# Patient Record
(demographics unavailable — no encounter records)

---

## 2025-02-05 NOTE — HISTORY OF PRESENT ILLNESS
[de-identified] : The patient presents with a history of recurrent ear infections. The child has had 5-6 ear infections in the past 6 months requiring antibiotics.  No parental concerns with hearing. There is no history of otorrhea.  History of discolored nasal discharge and uses nasal saline sprays   known Speech Delay but better than before sentences May have occasional snoring at night. Wakes during the night if he has nasal congestion with cold.  No problems with swallowing or with VPI/nasal regurgitation. History of adenoidectomy in [place] 2023 No throat/tonsil infections.   Passed NBHT AU.  Full term,  uncomplicated delivery with uncomplicated pregnancy.  No cyanosis, no ETT intubation, no home oxygen requirement, no NICU stay.

## 2025-02-05 NOTE — CONSULT LETTER
[Dear  ___] : Dear  [unfilled], [Consult Letter:] : I had the pleasure of evaluating your patient, [unfilled]. [Please see my note below.] : Please see my note below. [Consult Closing:] : Thank you very much for allowing me to participate in the care of this patient.  If you have any questions, please do not hesitate to contact me. [Sincerely,] : Sincerely, [FreeTextEntry2] : Dr. Christian Martínez [address], [address] , [place] [address] [FreeTextEntry3] : Allyson Diaz MD   Pediatric Otolaryngology/ Head & Neck Surgery [facility] , Otolaryngology; [facility]  [facility] [place] [address] Tel [phone] Fax [phone]

## 2025-02-05 NOTE — REASON FOR VISIT
[Other: ______] : provided by JEREMIAH [Initial Evaluation] : an initial evaluation for [Parents] : parents [Interpreters_IDNumber] : [id number] [Interpreters_FullName] : Nelson [FreeTextEntry3] : Terrance ID# [id number] [TWNoteComboBox1] : Georgian

## 2025-02-05 NOTE — ASSESSMENT
[FreeTextEntry1] : History of recurrent acute otitis media. I discussed the option of ear tube placement. I have explained the risks of myringotomy and tube including but not limited to general anesthesia, bleeding, infection, persistent symptoms, retained ear tube, otorrhea, tympanic membrane perforation, and possible need for further procedures.  The family would like to proceed with a BMT if fluid at fu. The patient will get floxin otic (5 drops bid for 3 days then as needed for otorrhea/infection).  Given repeat ear infections and congestion despite normal scope I do recommend a allergy team eval and nasal saline

## 2025-02-05 NOTE — DATA REVIEWED
[FreeTextEntry1] : An audiogram was performed today to evaluate eustachian tube status and hearing status and the results were reviewed and reveal: Tymps: AD type B tympanogram, AS type B tympanogram Soundfield/Thresholds: Mild HL

## 2025-02-05 NOTE — PHYSICAL EXAM
[Clear to Auscultation] : lungs were clear to auscultation bilaterally [Normal Gait and Station] : normal gait and station [Normal muscle strength, symmetry and tone of facial, head and neck musculature] : normal muscle strength, symmetry and tone of facial, head and neck musculature [Normal] : no cervical lymphadenopathy [Effusion] : effusion [Exposed Vessel] : left anterior vessel not exposed [Wheezing] : no wheezing [Increased Work of Breathing] : no increased work of breathing with use of accessory muscles and retractions

## 2025-02-05 NOTE — BIRTH HISTORY
[At Term] : at term [ Section] : by  section [None] : No maternal complications [Passed] : passed [de-identified] : low amniotic fluid, fetal distress

## 2025-02-21 NOTE — PHYSICAL EXAM
[Well Developed] : well developed [NAD] : in no acute distress [Thin] : thin [PERRL] : pupils were equal, round, reactive to light  [Moist & Pink Mucous Membranes] : moist and pink mucous membranes [CTAB] : lungs clear to auscultation bilaterally [Regular Rate and Rhythm] : regular rate and rhythm [Normal S1, S2] : normal S1 and S2 [Soft] : soft  [Normal Bowel Sounds] : normal bowel sounds [No HSM] : no hepatosplenomegaly appreciated [Normal rectal exam] : exam was normal [Normal Tone] : normal tone [Well-Perfused] : well-perfused [Interactive] : interactive [icteric] : anicteric [Respiratory Distress] : no respiratory distress  [Distended] : non distended [Tender] : non tender [Edema] : no edema [Cyanosis] : no cyanosis [Rash] : no rash [Jaundice] : no jaundice

## 2025-02-21 NOTE — CONSULT LETTER
[Consult Letter:] : I had the pleasure of evaluating your patient, [unfilled]. [Please see my note below.] : Please see my note below. [Consult Closing:] : Thank you very much for allowing me to participate in the care of this patient.  If you have any questions, please do not hesitate to contact me. [Sincerely,] : Sincerely, [Dear  ___] : Dear  [unfilled], [FreeTextEntry3] : Andriy Deluna MD Division of Pediatric Gastroenterology [facility]

## 2025-02-21 NOTE — REASON FOR VISIT
[Consultation] : a consultation visit [Mother] : mother [Father] : father [Medical Records] : medical records [Other: ______] : provided by JEREMIAH [Interpreters_IDNumber] : [id number] [Interpreters_FullName] : Delma [TWNoteComboBox1] : Czech

## 2025-02-21 NOTE — ASSESSMENT
[FreeTextEntry1] : 6 year old male with poor weight gain, poor appetite, low BMI with tendency toward constipation. Failure to thrive most likely secondary to inadequate caloric intake but would assess further for an inflammatory, systemic or infectious process  Plan: lab assessment stool for H pylori  inc caloric intake - resources provided nutritional supplements record dietary intake to assess caloric intake consider addition of appetite stimulant nutrition f/u

## 2025-02-21 NOTE — HISTORY OF PRESENT ILLNESS
[de-identified] : 6 year old male with poor weight gain and poor appetite. No c/o abd pain. No N/V. BMs 1-2x/d, [place] 1 or 3. Tendency toward constipation. History of adenoidectomy in [place] 2023 IUGR, Csxn repeat scheduled Lives with parents and older brother Diet Hx obtained,  Family Hx: non-contributory

## 2025-02-25 NOTE — REASON FOR VISIT
[Initial Evaluation] : an initial evaluation [Patient] : patient [Parents] : parents [Language Line ] : provided by Language Line   [Interpreters_IDNumber] : [id number] [Interpreters_FullName] : [employer] [TWNoteComboBox1] : Syriac

## 2025-02-25 NOTE — HISTORY OF PRESENT ILLNESS
[de-identified] : (School) Unsure PO intake [de-identified] : (School) Unsure PO intake [de-identified] : (2:30p; home): Offered Chicken w/ pasta OR Pizza OR Croissant. Pt consumes 3 d/wk. Otherwises refuses. [de-identified] : (7p) Offered Protein (Fish/chicken/meat) + Rice/pasta + cucumbers. Pt consumes "few bites" [de-identified] : (1-2 d/wk) 1 yogurt OR Piece of cake [FreeTextEntry1] : DANIEL GUEVARA is a 7 yo male w/ FTT, here for nutrition assessment for poor weight gain.  Overall, dietary intake is vague. Pt is offered breakfast and lunch at school; however unclear PO intake. Parents offer meals BID (2:30p and dinner) however w/ fluctuating intake. FOC endorses "few bites;" however unable to specify. Pt loves cucumbers, but declines cucumbers dipped in condiments/sauces or mixed w/ yogurt. Parents offered hummus, however pt decline to try. Pt dislikes avocado and will pick out from meals. Pt dislikes Nut butters. Pt tolerates small amounts of nuts and cheese in pasta. Pt decline to eat cheese alone. Parents denies any chewing or swallowing difficulties. Parents denies any food allergies or dietary restrictions.   Since last GI Visit (2/21), FOC initiated Pediasure Supplement. Pt likes Pediasure G&G (chocolate) and drinks 1 can/d (Provides 240 kcal/d). Pt has not trialed other flavors.  Since 2/5/25-2/24/25, pt gained 0.21 kg x 19d (11 g/d; excellent) Good UO Constipation + straining daily.  Receives Speech therapy 2x/wk.

## 2025-05-01 NOTE — PHYSICAL EXAM
[Alert] : alert [Well Nourished] : well nourished [Healthy Appearance] : healthy appearance [No Acute Distress] : no acute distress [Well Developed] : well developed [Normal Pupil & Iris Size/Symmetry] : normal pupil and iris size and symmetry [No Discharge] : no discharge [No Photophobia] : no photophobia [Sclera Not Icteric] : sclera not icteric [Normal TMs] : both tympanic membranes were normal [Normal Lips/Tongue] : the lips and tongue were normal [Normal Outer Ear/Nose] : the ears and nose were normal in appearance [No Thrush] : no thrush [Pale mucosa] : pale mucosa [Boggy Nasal Turbinates] : boggy and/or pale nasal turbinates [Pharyngeal erythema] : pharyngeal erythema [Posterior Pharyngeal Cobblestoning] : posterior pharyngeal cobblestoning [Clear Rhinorrhea] : clear rhinorrhea was seen [Supple] : the neck was supple [Normal Rate and Effort] : normal respiratory rhythm and effort [No Crackles] : no crackles [No Retractions] : no retractions [Bilateral Audible Breath Sounds] : bilateral audible breath sounds [Normal Rate] : heart rate was normal  [Regular Rhythm] : with a regular rhythm [Soft] : abdomen soft [Not Tender] : non-tender [Not Distended] : not distended [No HSM] : no hepato-splenomegaly [Normal Cervical Lymph Nodes] : cervical [No Rash] : no rash [No clubbing] : no clubbing [No Cyanosis] : no cyanosis [Normal Mood] : mood was normal [Normal Affect] : affect was normal [Alert, Awake, Oriented as Age-Appropriate] : alert, awake, oriented as age appropriate [Exudate] : no exudate [Wheezing] : no wheezing was heard [Urticaria] : no urticaria

## 2025-05-01 NOTE — CONSULT LETTER
[Dear  ___] : Dear  [unfilled], [Consult Letter:] : I had the pleasure of evaluating your patient, [unfilled]. [Please see my note below.] : Please see my note below. [Consult Closing:] : Thank you very much for allowing me to participate in the care of this patient.  If you have any questions, please do not hesitate to contact me. [Sincerely,] : Sincerely, [FreeTextEntry3] : MD Devorah Suarez MD FAAAAI, RUTHY Adult and Pediatric Allergy, Asthma and Clinical Immunology Associate Professor of Medicine and Pediatrics at   [facility] Section Head, Adult Allergy and Immunology   [facility]   Division of Allergy, Asthma and Immunology   [address] [place] [address]   Phone [phone]  Fax [phone]

## 2025-05-01 NOTE — HISTORY OF PRESENT ILLNESS
[de-identified] :  [id number] (Lao) Evelyn is presenting for evaluation of redness, congestion, and allergic conjunctivitis. As per mother, he always has redness in his eyes and lots of mucus, in addition to coughing from the mucus, Additionally, he  frequently scratches his body, lower backside and arms.  These symptoms get worse at end of march throughout april.   These symptoms started since beginning of this year (happening for one year). He has Also saw ENT for multiple ear infections--being evaluated for Bilateral Tympanostomy tube placement. As per mother, he had an adenoidectomy in [place] 2.5 years ago. He is also being evaluated by GI for poor weight gain.   No food allergies--eats soy, shell fish, fish, peanut, tree nuts, dairy, wheat.  No history of atopic dermatitis.  No history of asthma.  Lips are cracked.   No animals at home. No smokers at home.  He has younger brother who is healthy.   When he develops these symptoms-she gives him eyedrops (saline--last used 5 days ago). Never antihistamines.   No otehr antihistamines.   Mom and dad healthy.   No recent travel.

## 2025-05-01 NOTE — CONSULT LETTER
[Dear  ___] : Dear  [unfilled], [Consult Letter:] : I had the pleasure of evaluating your patient, [unfilled]. [Please see my note below.] : Please see my note below. [Consult Closing:] : Thank you very much for allowing me to participate in the care of this patient.  If you have any questions, please do not hesitate to contact me. [Sincerely,] : Sincerely, [FreeTextEntry3] : MD Devorah Suarez MD FAAAAI, RUTHY Adult and Pediatric Allergy, Asthma and Clinical Immunology Associate Professor of Medicine and Pediatrics at   [facility] Section Head, Adult Allergy and Immunology   [facility]   Division of Allergy, Asthma and Immunology   [address] [place] [address]   Phone [phone]  Fax [phone]

## 2025-05-01 NOTE — REASON FOR VISIT
[Initial Consultation] : an initial consultation for [Parents] : parents [Language Line ] : provided by Language Line   [Time Spent: ____ minutes] : Total time spent using  services: [unfilled] minutes. The patient's primary language is not English thus required  services. [FreeTextEntry2] : recurrent ear infections, env allergy eval [Interpreters_IDNumber] : [id number]  [TWNoteComboBox1] : Latvian

## 2025-05-01 NOTE — HISTORY OF PRESENT ILLNESS
[de-identified] :  [id number] (Korean) Evelyn is presenting for evaluation of redness, congestion, and allergic conjunctivitis. As per mother, he always has redness in his eyes and lots of mucus, in addition to coughing from the mucus, Additionally, he  frequently scratches his body, lower backside and arms.  These symptoms get worse at end of march throughout april.   These symptoms started since beginning of this year (happening for one year). He has Also saw ENT for multiple ear infections--being evaluated for Bilateral Tympanostomy tube placement. As per mother, he had an adenoidectomy in [place] 2.5 years ago. He is also being evaluated by GI for poor weight gain.   No food allergies--eats soy, shell fish, fish, peanut, tree nuts, dairy, wheat.  No history of atopic dermatitis.  No history of asthma.  Lips are cracked.   No animals at home. No smokers at home.  He has younger brother who is healthy.   When he develops these symptoms-she gives him eyedrops (saline--last used 5 days ago). Never antihistamines.   No otehr antihistamines.   Mom and dad healthy.   No recent travel.

## 2025-05-01 NOTE — REASON FOR VISIT
[Initial Consultation] : an initial consultation for [Parents] : parents [Language Line ] : provided by Language Line   [Time Spent: ____ minutes] : Total time spent using  services: [unfilled] minutes. The patient's primary language is not English thus required  services. [FreeTextEntry2] : recurrent ear infections, env allergy eval [Interpreters_IDNumber] : [id number]  [TWNoteComboBox1] : Turkish

## 2025-05-12 NOTE — HISTORY OF PRESENT ILLNESS
[de-identified] : The patient presents with a history of recurrent ear infections.  No history of ear infections since his last office evaluation in February, 2025. Total of 6 ear infections in the past 6 months more frequent infections in the winter.  minimal adenoid on scope  No parental concerns with hearing. Receive speech services in school for his history of speech delay.  He speaks in sentences There is no history of otorrhea.  History of OME in the past  History of discolored nasal discharge and uses nasal saline sprays  History of nasal congestion with discolored nasal discharge  Occasionally snores during while asleep but he is a mouth breather.  Wakes during the night if he has nasal congestion with cold but otherwise sleep through the night  No problems with swallowing or with VPI/nasal regurgitation. History of adenoidectomy in [place] 2023 No throat/tonsil infections.  He has been evaluated by an allergist, Dr. Devorah Hall, on 4/30/225 Passed [facility].

## 2025-05-12 NOTE — REASON FOR VISIT
[Initial Evaluation] : an initial evaluation for [Mother] : mother [Language Line ] : provided by Language Line   [Interpreters_IDNumber] : [id number] [Interpreters_FullName] : Wendy [TWNoteComboBox1] : Japanese

## 2025-05-12 NOTE — PHYSICAL EXAM
[Effusion] : effusion [Clear to Auscultation] : lungs were clear to auscultation bilaterally [Normal Gait and Station] : normal gait and station [Normal muscle strength, symmetry and tone of facial, head and neck musculature] : normal muscle strength, symmetry and tone of facial, head and neck musculature [Normal] : no cervical lymphadenopathy [Exposed Vessel] : left anterior vessel not exposed [Wheezing] : no wheezing [Increased Work of Breathing] : no increased work of breathing with use of accessory muscles and retractions

## 2025-05-12 NOTE — CONSULT LETTER
[Dear  ___] : Dear  [unfilled], [Consult Letter:] : I had the pleasure of evaluating your patient, [unfilled]. [Please see my note below.] : Please see my note below. [Consult Closing:] : Thank you very much for allowing me to participate in the care of this patient.  If you have any questions, please do not hesitate to contact me. [Sincerely,] : Sincerely, [FreeTextEntry2] : Dr. Christian Martínez [address], [address] [place] [address] [FreeTextEntry3] : Allyson Diaz MD   Pediatric Otolaryngology/ Head & Neck Surgery [facility] , Otolaryngology; [facility]  [facility] [place] [address] Tel [phone] Fax [phone]

## 2025-05-12 NOTE — BIRTH HISTORY
[At Term] : at term [ Section] : by  section [None] : No maternal complications [Passed] : passed [de-identified] : low amniotic fluid, fetal distress

## 2025-05-12 NOTE — ASSESSMENT
[FreeTextEntry1] : History of recurrent acute otitis media. I discussed the option of ear tube placement. I have explained the risks of myringotomy and tube including but not limited to general anesthesia, bleeding, infection, persistent symptoms, retained ear tube, otorrhea, tympanic membrane perforation, and possible need for further procedures.  The family would like to proceed with a BMT. The patient will get floxin otic (5 drops bid for 3 days then as needed for otorrhea/infection).

## 2025-05-16 NOTE — ASSESSMENT
[Educated Patient & Family about Diagnosis] : educated the patient and family about the diagnosis [FreeTextEntry1] : 6-year-old male with poor weight gain, poor appetite, low BMI with tendency toward constipation. Essentially unremarkable screening labs and stool for H-Pylori February 2025, other than low Vit D, now with Vit D supplementation started in March. Returns today with excellent weight gain on regular diet and supplementation with Pediasure. No GI complaints.  Plan:  -Continue Vit D supplementation, 5mL daily. RX sent to the pharmacy. -Continue high calorie diet -Continue supplementation with Pediasure 3-4 bottles daily. -Follow up in 3-4 months with NP after returning home from [place]. -For slow weight gain, consider addition of appetite stimulant -Call sooner with questions, concerns or change in clinical status.

## 2025-05-16 NOTE — REASON FOR VISIT
[Consultation Follow Up] : a consultation follow up  [Mother] : mother [Father] : father [Medical Records] : medical records [Language Line ] : provided by Language Line   [Interpreters_IDNumber] : [id number] [Interpreters_FullName] : Dariela [TWNoteComboBox1] : Slovak

## 2025-05-16 NOTE — HISTORY OF PRESENT ILLNESS
[de-identified] : Evelyn is a 6-year-old male with poor weight gain and poor appetite initially evaluated by Dr Deluna in February here today for follow up.   Weight: 19.4kg, gained 2.4kg since initial visit in February. ~29 grams/day.  Screening labs essentially unremarkable, other than low vitamin D- 25 Hydroxy (12.5ng/mL). Taking vitamin D daily 5mL. Eating a regular diet. Introduced new foods, including peanut butter, pasta, meats, vegetables and some fruits (watermelon and banana). Drinking Pediasure, 2-4 boxes daily. Enjoys drinking water. No coughing, choking, gagging or dysphagia. No abdominal pain, vomiting, or ASAF. Stooling daily [place] 4, no mucus or blood. In the past, [place] 1-2, which has improved with oral intake and supplementation with Pediasure.  Not taking any laxatives. No other daily medications. Good UO. Active and interactive. Family planning to travel to [place] for the summer.  History of adenoidectomy in [place] 2023 IUGR, Csxn repeat scheduled Lives with parents and older brother Family Hx: non-contributory

## 2025-05-16 NOTE — PHYSICAL EXAM
[Well Developed] : well developed [NAD] : in no acute distress [Thin] : thin [PERRL] : pupils were equal, round, reactive to light  [Moist & Pink Mucous Membranes] : moist and pink mucous membranes [CTAB] : lungs clear to auscultation bilaterally [Regular Rate and Rhythm] : regular rate and rhythm [Normal S1, S2] : normal S1 and S2 [Soft] : soft  [Normal Bowel Sounds] : normal bowel sounds [No HSM] : no hepatosplenomegaly appreciated [Normal rectal exam] : exam was normal [Normal Tone] : normal tone [Well-Perfused] : well-perfused [Interactive] : interactive [Appropriate Behavior] : appropriate behavior [icteric] : anicteric [Respiratory Distress] : no respiratory distress  [Distended] : non distended [Tender] : non tender [Edema] : no edema [Cyanosis] : no cyanosis [Rash] : no rash [Jaundice] : no jaundice

## 2025-06-11 NOTE — REASON FOR VISIT
[Routine Follow-Up] : a routine follow-up visit for [Mother] : mother [Allergy Evaluation/ Skin Testing] : allergy evaluation and or skin testing

## 2025-06-12 NOTE — END OF VISIT
[FreeTextEntry3] : I, Dr. Devorah Hall, personally performed the evaluation and management (E/M) services for this established patient who presents today with (a) new problem(s)/exacerbation of (an) existing condition(s).  That E/M includes conducting the examination, assessing all new/exacerbated conditions, and establishing a new plan of care.  Today, my NICK, [employer], was here to observe my evaluation and management services for this new problem/exacerbated condition to be followed going forward.

## 2025-06-12 NOTE — REVIEW OF SYSTEMS
[Fatigue] : no fatigue [Fever] : no fever [Cyanosis] : no cyanosis [Edema] : no edema [Exercise Intolerance] : no persistence of exercise intolerance [Nocturnal Awakening] : no nocturnal awakening with shortness of breath [Cough] : no cough [Wheezing Worsens With Exercise] : wheezing does not worsen with exercise [FreeTextEntry3] : watery eyes and discharge.  [FreeTextEntry4] : see HPI  [de-identified] : see HPI

## 2025-06-12 NOTE — REVIEW OF SYSTEMS
[Fatigue] : no fatigue [Fever] : no fever [Cyanosis] : no cyanosis [Edema] : no edema [Exercise Intolerance] : no persistence of exercise intolerance [Nocturnal Awakening] : no nocturnal awakening with shortness of breath [Cough] : no cough [Wheezing Worsens With Exercise] : wheezing does not worsen with exercise [FreeTextEntry3] : watery eyes and discharge.  [FreeTextEntry4] : see HPI  [de-identified] : see HPI

## 2025-06-12 NOTE — END OF VISIT
[FreeTextEntry3] : I, Dr. Devorah Hall, personally performed the evaluation and management (E/M) services for this established patient who presents today with (a) new problem(s)/exacerbation of (an) existing condition(s).  That E/M includes conducting the examination, assessing all new/exacerbated conditions, and establishing a new plan of care.  Today, my NICK, [employer], was here to observe my evaluation and management services for this new problem/exacerbated condition to be followed going forward.

## 2025-06-12 NOTE — HISTORY OF PRESENT ILLNESS
[de-identified] : Evelyn is presenting for evaluation of redness, congestion, and allergic conjunctivitis. As per mother, he always has redness in his eyes and lots of mucus, in addition to coughing from the mucus, Additionally, he frequently scratches his body, lower backside and arms. These symptoms get worse at end of march throughout april.  6/11/2025: Due to nasal congestion, he was placed on cetirizine and Flonase without improvement of nasal symptoms.  His skin test and blood work are negative to environmental allergy.  2/2025- ENT notes, stated- The hypopharynx was clear with no lesions or masses. No laryngomalacia, no obstruction by ADENOIDS  -when he wakes up sometime gets erythematous rash on his upper extremity which self resolves. He uses which lotion dove.  -Intermittent watery eyes with discharge.  Immune work up- wnl.  followed by GI- low weight, as per notes gaining weight.    Past history:  These symptoms started since beginning of this year (happening for one year). He has Also saw ENT for multiple ear infections--being evaluated for Bilateral Tympanostomy tube placement. As per mother, he had an adenoidectomy in [place] 2.5 years ago. He is also being evaluated by GI for poor weight gain.  No food allergies--eats soy, shell fish, fish, peanut, tree nuts, dairy, wheat. No history of atopic dermatitis. No history of asthma. Lips are cracked.  No animals at home. No smokers at home. He has younger brother who is healthy.  When he develops these symptoms-she gives him eyedrops (saline--last used 5 days ago). Never antihistamines.   No otehr antihistamines.  Mom and dad healthy.  No recent travel.

## 2025-06-12 NOTE — HISTORY OF PRESENT ILLNESS
[de-identified] : Evelyn is presenting for evaluation of redness, congestion, and allergic conjunctivitis. As per mother, he always has redness in his eyes and lots of mucus, in addition to coughing from the mucus, Additionally, he frequently scratches his body, lower backside and arms. These symptoms get worse at end of march throughout april.  6/11/2025: Due to nasal congestion, he was placed on cetirizine and Flonase without improvement of nasal symptoms.  His skin test and blood work are negative to environmental allergy.  2/2025- ENT notes, stated- The hypopharynx was clear with no lesions or masses. No laryngomalacia, no obstruction by ADENOIDS  -when he wakes up sometime gets erythematous rash on his upper extremity which self resolves. He uses which lotion dove.  -Intermittent watery eyes with discharge.  Immune work up- wnl.  followed by GI- low weight, as per notes gaining weight.    Past history:  These symptoms started since beginning of this year (happening for one year). He has Also saw ENT for multiple ear infections--being evaluated for Bilateral Tympanostomy tube placement. As per mother, he had an adenoidectomy in [place] 2.5 years ago. He is also being evaluated by GI for poor weight gain.  No food allergies--eats soy, shell fish, fish, peanut, tree nuts, dairy, wheat. No history of atopic dermatitis. No history of asthma. Lips are cracked.  No animals at home. No smokers at home. He has younger brother who is healthy.  When he develops these symptoms-she gives him eyedrops (saline--last used 5 days ago). Never antihistamines.   No otehr antihistamines.  Mom and dad healthy.  No recent travel.